# Patient Record
Sex: FEMALE | Race: WHITE | HISPANIC OR LATINO | Employment: UNEMPLOYED | URBAN - METROPOLITAN AREA
[De-identification: names, ages, dates, MRNs, and addresses within clinical notes are randomized per-mention and may not be internally consistent; named-entity substitution may affect disease eponyms.]

---

## 2023-06-10 ENCOUNTER — OFFICE VISIT (OUTPATIENT)
Dept: URGENT CARE | Facility: PHYSICIAN GROUP | Age: 53
End: 2023-06-10

## 2023-06-10 VITALS
WEIGHT: 152 LBS | HEART RATE: 64 BPM | RESPIRATION RATE: 16 BRPM | OXYGEN SATURATION: 98 % | SYSTOLIC BLOOD PRESSURE: 120 MMHG | DIASTOLIC BLOOD PRESSURE: 78 MMHG | BODY MASS INDEX: 25.33 KG/M2 | HEIGHT: 65 IN | TEMPERATURE: 98.5 F

## 2023-06-10 DIAGNOSIS — T36.95XA ALLERGIC REACTION DUE TO ANTIBACTERIAL DRUG: ICD-10-CM

## 2023-06-10 DIAGNOSIS — L50.9 URTICARIA: ICD-10-CM

## 2023-06-10 PROCEDURE — 3074F SYST BP LT 130 MM HG: CPT | Performed by: NURSE PRACTITIONER

## 2023-06-10 PROCEDURE — 99203 OFFICE O/P NEW LOW 30 MIN: CPT | Performed by: NURSE PRACTITIONER

## 2023-06-10 PROCEDURE — 3078F DIAST BP <80 MM HG: CPT | Performed by: NURSE PRACTITIONER

## 2023-06-10 RX ORDER — HYDROXYZINE HYDROCHLORIDE 25 MG/1
25 TABLET, FILM COATED ORAL 3 TIMES DAILY PRN
Qty: 30 TABLET | Refills: 0 | Status: SHIPPED | OUTPATIENT
Start: 2023-06-10

## 2023-06-10 RX ORDER — CETIRIZINE HYDROCHLORIDE 10 MG/1
10 TABLET ORAL DAILY
Qty: 30 TABLET | Refills: 0 | Status: SHIPPED | OUTPATIENT
Start: 2023-06-10

## 2023-06-10 RX ORDER — CEPHALEXIN 500 MG/1
CAPSULE ORAL
COMMUNITY
Start: 2023-06-06

## 2023-06-10 RX ORDER — DEXAMETHASONE SODIUM PHOSPHATE 4 MG/ML
8 INJECTION, SOLUTION INTRA-ARTICULAR; INTRALESIONAL; INTRAMUSCULAR; INTRAVENOUS; SOFT TISSUE ONCE
Status: COMPLETED | OUTPATIENT
Start: 2023-06-10 | End: 2023-06-10

## 2023-06-10 RX ORDER — METHYLPREDNISOLONE 4 MG/1
TABLET ORAL
Qty: 21 TABLET | Refills: 0 | Status: SHIPPED | OUTPATIENT
Start: 2023-06-10

## 2023-06-10 RX ADMIN — DEXAMETHASONE SODIUM PHOSPHATE 8 MG: 4 INJECTION, SOLUTION INTRA-ARTICULAR; INTRALESIONAL; INTRAMUSCULAR; INTRAVENOUS; SOFT TISSUE at 16:31

## 2023-06-10 NOTE — PROGRESS NOTES
"Subjective     MA Rosa Contreras is a 52 y.o. female who presents with Rash (Neck, very red, new meds since Tuesday, x 1 day )            Rash    [place] speaker, ipad  used.   Rash on neck since yesterday. Experiencing redness and itchiness. Recently started antibiotics:Keflex, Bactroban for rash on left lower leg for folliculitis. Started Tuesday, rash appeared yesterday. Applied over the counter Cortisone 1% cream started today 1x but did not help with one application. Carmenza cream on neck helped with itchiness, states \"soothing\". No others at home with rash. Denies difficulty with swallowing, breathing or speaking. No known allergies to medications, environment, seasonal or foods.     PMH:  has no past medical history on file.  MEDS:   Current Outpatient Medications:     cephALEXin (KEFLEX) 500 MG Cap, RAJWINDER 1 CAPSULA POR VIA ORAL 4 VECES AL SALENA ARMAAN 7 MADRIGAL PARA LA INFECCION DE LA PIEL, Disp: , Rfl:     mupirocin (BACTROBAN) 2 % Ointment, 1 Application., Disp: , Rfl:   ALLERGIES: No Known Allergies  SURGHX: History reviewed. No pertinent surgical history.  SOCHX:    FH: Family history was reviewed, no pertinent findings to report      Review of Systems   Constitutional: Negative.    Respiratory: Negative.     Skin:  Positive for itching and rash.   Endo/Heme/Allergies:  Negative for environmental allergies.              Objective     /78   Pulse 64   Temp 36.9 °C (98.5 °F)   Resp 16   Ht 1.651 m (5' 5\")   Wt 68.9 kg (152 lb)   SpO2 98%   BMI 25.29 kg/m²      Physical Exam  Vitals reviewed.   Constitutional:       General: She is awake. She is not in acute distress.     Appearance: Normal appearance. She is well-developed. She is not ill-appearing, toxic-appearing or diaphoretic.   HENT:      Head: Normocephalic.      Nose: Nose normal.      Mouth/Throat:      Lips: Pink.      Mouth: Mucous membranes are dry.      Pharynx: Oropharynx is clear. Uvula midline.   Eyes:      " Conjunctiva/sclera: Conjunctivae normal.   Cardiovascular:      Rate and Rhythm: Normal rate.   Pulmonary:      Effort: Pulmonary effort is normal. No accessory muscle usage or respiratory distress.      Breath sounds: Normal breath sounds and air entry.   Musculoskeletal:         General: Normal range of motion.      Cervical back: Normal range of motion and neck supple.   Skin:     General: Skin is warm and dry.      Findings: Erythema and rash present. Rash is macular and urticarial.          Neurological:      Mental Status: She is alert and oriented to person, place, and time.   Psychiatric:         Behavior: Behavior is cooperative.                             Assessment & Plan        1. Allergic reaction due to antibacterial drug    - methylPREDNISolone (MEDROL DOSEPAK) 4 MG Tablet Therapy Pack; Follow schedule on package instructions.  Dispense: 21 Tablet; Refill: 0  - cetirizine (ZYRTEC) 10 MG Tab; Take 1 Tablet by mouth every day.  Dispense: 30 Tablet; Refill: 0  - dexamethasone (DECADRON) injection 8 mg    2. Urticaria    - hydrOXYzine HCl (ATARAX) 25 MG Tab; Take 1 Tablet by mouth 3 times a day as needed for Itching. Causes drowsiness, do not drive or work while using. Caution with use with other sedating medications.  Dispense: 30 Tablet; Refill: 0  - cetirizine (ZYRTEC) 10 MG Tab; Take 1 Tablet by mouth every day.  Dispense: 30 Tablet; Refill: 0  - dexamethasone (DECADRON) injection 8 mg    Rash most likely from antibiotic use- no other causes or changes indicated- discontinue oral antibiotics, may continue topical Bactroban to left lower leg as directed (Keflex placed on allergy list today)   -May clean area with mild soap, do not scrub, wash with tepid water, pat dry  -May use over the counter hydrocortisone cream 1% twice daily up to 10-14 days as needed (discontinue if not helping)  -May use Carmenza or aloe gel to help soothe skin  -Cool compress to rash to calm inflammation  -Monitor for increase in  rash size or areas affected, pain, worse itchiness, redness with blistering, fever, shortness of breath, difficulty swallowing,breathing, speaking- re-evaluate in urgent care or emergency room

## 2023-06-12 ASSESSMENT — ENCOUNTER SYMPTOMS
CONSTITUTIONAL NEGATIVE: 1
RESPIRATORY NEGATIVE: 1

## 2024-02-17 ENCOUNTER — HOSPITAL ENCOUNTER (OUTPATIENT)
Dept: RADIOLOGY | Facility: MEDICAL CENTER | Age: 54
End: 2024-02-17
Attending: NURSE PRACTITIONER

## 2024-02-17 ENCOUNTER — OFFICE VISIT (OUTPATIENT)
Dept: URGENT CARE | Facility: PHYSICIAN GROUP | Age: 54
End: 2024-02-17

## 2024-02-17 VITALS
HEIGHT: 64 IN | DIASTOLIC BLOOD PRESSURE: 68 MMHG | TEMPERATURE: 98.2 F | OXYGEN SATURATION: 97 % | HEART RATE: 114 BPM | BODY MASS INDEX: 25.47 KG/M2 | WEIGHT: 149.2 LBS | RESPIRATION RATE: 16 BRPM | SYSTOLIC BLOOD PRESSURE: 114 MMHG

## 2024-02-17 DIAGNOSIS — M54.50 ACUTE MIDLINE LOW BACK PAIN WITHOUT SCIATICA: ICD-10-CM

## 2024-02-17 DIAGNOSIS — M51.36 DDD (DEGENERATIVE DISC DISEASE), LUMBAR: ICD-10-CM

## 2024-02-17 PROCEDURE — 3078F DIAST BP <80 MM HG: CPT | Performed by: NURSE PRACTITIONER

## 2024-02-17 PROCEDURE — 72100 X-RAY EXAM L-S SPINE 2/3 VWS: CPT

## 2024-02-17 PROCEDURE — 3074F SYST BP LT 130 MM HG: CPT | Performed by: NURSE PRACTITIONER

## 2024-02-17 PROCEDURE — 99214 OFFICE O/P EST MOD 30 MIN: CPT | Performed by: NURSE PRACTITIONER

## 2024-02-17 RX ORDER — METHOCARBAMOL 750 MG/1
750 TABLET, FILM COATED ORAL EVERY 8 HOURS PRN
Qty: 30 TABLET | Refills: 0 | Status: SHIPPED | OUTPATIENT
Start: 2024-02-17

## 2024-02-17 RX ORDER — PREDNISONE 20 MG/1
TABLET ORAL
Qty: 15 TABLET | Refills: 0 | Status: SHIPPED | OUTPATIENT
Start: 2024-02-17

## 2024-02-17 ASSESSMENT — ENCOUNTER SYMPTOMS
ABDOMINAL PAIN: 0
BOWEL INCONTINENCE: 0
NAUSEA: 0
BACK PAIN: 1
PERIANAL NUMBNESS: 0
SENSORY CHANGE: 0
NEUROLOGICAL NEGATIVE: 1
CONSTITUTIONAL NEGATIVE: 1
VOMITING: 0
WEAKNESS: 0
RESPIRATORY NEGATIVE: 1
CARDIOVASCULAR NEGATIVE: 1

## 2024-02-17 ASSESSMENT — VISUAL ACUITY: OU: 1

## 2024-02-17 NOTE — PROGRESS NOTES
Subjective:     FLY Byers is a 53 y.o. female who presents for Back Pain (Lower back pain since Thursday has been having pain, radiating towards the hips, can't sit or move, no numbness, she took advil for the pain it went away but when she was changing the pain started back again but 10x worse, it hurts to sit or move anything at all )       Back Pain  This is a new problem. The problem has been gradually worsening since onset. Pertinent negatives include no abdominal pain, bladder incontinence, bowel incontinence, dysuria, perianal numbness or weakness.     [place] translation provided by professional video conferencing service.    Patient reports on Thursday, she was sitting down eating when she started to notice new onset of lower middle back pain.  Denies injury.  Worsens with movement and position.  Took some ibuprofen which initially helped.  However, symptoms came back.  Ibuprofen no longer helping.  Tried prednisone 10 mg that she received from someone else and tried applying patches on the back brace.  No improvement so far.    Denies flank pain, abdominal pain, fever, chills, sensory changes, weakness, urinary problems, or other symptoms.    Review of Systems   Constitutional: Negative.    Respiratory: Negative.     Cardiovascular: Negative.    Gastrointestinal:  Negative for abdominal pain, bowel incontinence, nausea and vomiting.   Genitourinary: Negative.  Negative for bladder incontinence, dysuria, frequency and urgency.   Musculoskeletal:  Positive for back pain.   Neurological: Negative.  Negative for sensory change and weakness.   All other systems reviewed and are negative.    Refer to HPI for additional details.    During this visit, appropriate PPE was worn, and hand hygiene was performed.    PMH:  has no past medical history on file.    MEDS:   Current Outpatient Medications:     predniSONE (DELTASONE) 20 MG Tab, Take 3 tabs daily x 3 days, then 2 tabs daily x 2 days, then 1  "tab x 2 days., Disp: 15 Tablet, Rfl: 0    methocarbamol (ROBAXIN) 750 MG Tab, Take 1 Tablet by mouth every 8 hours as needed (Muscle spasm)., Disp: 30 Tablet, Rfl: 0    cephALEXin (KEFLEX) 500 MG Cap, RAJWINDER 1 CAPSULA POR VIA ORAL 4 VECES AL SALENA ARMAAN 7 MADRIGAL PARA LA INFECCION DE LA PIEL, Disp: , Rfl:     hydrOXYzine HCl (ATARAX) 25 MG Tab, Take 1 Tablet by mouth 3 times a day as needed for Itching. Causes drowsiness, do not drive or work while using. Caution with use with other sedating medications., Disp: 30 Tablet, Rfl: 0    methylPREDNISolone (MEDROL DOSEPAK) 4 MG Tablet Therapy Pack, Follow schedule on package instructions., Disp: 21 Tablet, Rfl: 0    cetirizine (ZYRTEC) 10 MG Tab, Take 1 Tablet by mouth every day., Disp: 30 Tablet, Rfl: 0    ALLERGIES:   Allergies   Allergen Reactions    Keflex [Cephalexin] Rash     rash     SURGHX: History reviewed. No pertinent surgical history.  SOCHX:      FH: Per HPI as applicable/pertinent.      Objective:     /68 (BP Location: Left arm, Patient Position: Sitting, BP Cuff Size: Adult)   Pulse (!) 114   Temp 36.8 °C (98.2 °F) (Temporal)   Resp 16   Ht 1.626 m (5' 4\")   Wt 67.7 kg (149 lb 3.2 oz)   SpO2 97%   BMI 25.61 kg/m²     Physical Exam  Nursing note reviewed.   Constitutional:       General: She is not in acute distress.     Appearance: She is well-developed. She is not ill-appearing or toxic-appearing.   Eyes:      General: Vision grossly intact.   Cardiovascular:      Rate and Rhythm: Tachycardia present.   Pulmonary:      Effort: Pulmonary effort is normal. No respiratory distress.   Abdominal:      Tenderness: There is no right CVA tenderness or left CVA tenderness.   Musculoskeletal:         General: No deformity.      Lumbar back: No swelling, deformity or tenderness. Decreased range of motion (Patient experiencing pain while positioning on/off exam table). Negative right straight leg raise test and negative left straight leg raise test.   Skin:   "   General: Skin is warm and dry.      Coloration: Skin is not pale.      Findings: No bruising, erythema or rash.   Neurological:      Mental Status: She is alert and oriented to person, place, and time.      Motor: No weakness.      Gait: Gait normal.   Psychiatric:         Behavior: Behavior normal. Behavior is cooperative.     X-ray of lumbar spine:    Details    Reading Physician Reading Date Result Priority   Paulino Diehl M.D.  [phone] 2/17/2024      Narrative & Impression     2/17/2024 12:49 PM     HISTORY/REASON FOR EXAM:  Atraumatic Pain    TECHNIQUE/ EXAM DESCRIPTION AND NUMBER OF VIEWS:  3 views of the lumbar spine.     COMPARISON: None.     FINDINGS:     The bony trabecular pattern is normal.     Evaluation of the sacrum is limited due to overlying bowel content.     No acute fracture. No malalignment. No compression deformity.     Minimal degenerative change of the lumbar spine.     IMPRESSION:     No acute osseous abnormality.  No malalignment.           Exam Ended: 02/17/24  1:00 PM Last Resulted: 02/17/24  1:09 PM           Radiology report and images reviewed by myself. Concur with findings.      Assessment/Plan:     1. Acute midline low back pain without sciatica  - DX-LUMBAR SPINE-2 OR 3 VIEWS; Future  - predniSONE (DELTASONE) 20 MG Tab; Take 3 tabs daily x 3 days, then 2 tabs daily x 2 days, then 1 tab x 2 days.  Dispense: 15 Tablet; Refill: 0  - methocarbamol (ROBAXIN) 750 MG Tab; Take 1 Tablet by mouth every 8 hours as needed (Muscle spasm).  Dispense: 30 Tablet; Refill: 0    2. DDD (degenerative disc disease), lumbar    Symptoms consistent with musculoskeletal etiology. Rest. Ice/heat as needed. Rx as above sent electronically. Advised to avoid NSAIDs while on steroid therapy.    Monitor. Warning signs reviewed. Return precautions advised.     Differential diagnosis, natural history, supportive care, over-the-counter symptom management per 's instructions, close monitoring,  and indications for immediate follow-up discussed.     All questions answered. Patient agrees with the plan of care.    Discharge summary provided.    Billing note: moderate complexity and moderate risk. Established patient. [id number]. Please refer to LOS tool for details.

## 2024-02-17 NOTE — PATIENT INSTRUCTIONS
X-ray of lumbar spine:    Details    Reading Physician Reading Date Result Priority   Paulino Diehl M.D.  [phone] 2/17/2024      Narrative & Impression     2/17/2024 12:49 PM     HISTORY/REASON FOR EXAM:  Atraumatic Pain    TECHNIQUE/ EXAM DESCRIPTION AND NUMBER OF VIEWS:  3 views of the lumbar spine.     COMPARISON: None.     FINDINGS:     The bony trabecular pattern is normal.     Evaluation of the sacrum is limited due to overlying bowel content.     No acute fracture. No malalignment. No compression deformity.     Minimal degenerative change of the lumbar spine.     IMPRESSION:     No acute osseous abnormality.  No malalignment.           Exam Ended: 02/17/24  1:00 PM Last Resulted: 02/17/24  1:09 PM